# Patient Record
(demographics unavailable — no encounter records)

---

## 2025-06-25 NOTE — HISTORY OF PRESENT ILLNESS
[Patient reported mammogram was normal] : Patient reported mammogram was normal [Patient reported breast sonogram was normal] : Patient reported breast sonogram was normal [Patient reported PAP Smear was normal] : Patient reported PAP Smear was normal [Patient reported bone density results were abnormal] : Patient reported bone density results were abnormal [Patient reported colonoscopy was normal] : Patient reported colonoscopy was normal [FreeTextEntry1] : 64 y/o PM  here for annual and to establish care.  New to practice and [facility].  No concerns/complaints.  LMP >10yrs ago. Has vaginal dryness but not sexually active so not bothering her.  H/o osteoporosis on medication. Managed by her PCP, wants to get order from them not us.  Reports gets US due to dense breast tissue.  No family h/o GYN malignancies.  h/o augmentation and implant removal many years ago.   [Mammogramdate] : 01/24 [BreastSonogramDate] : 01/24 [PapSmeardate] : 01/23 [BoneDensityDate] : 01/23 [TextBox_37] : osteoporosis [ColonoscopyDate] : 01/23 [TextBox_43] : recall 3 years

## 2025-06-25 NOTE — PHYSICAL EXAM
[MA] : MA [FreeTextEntry2] : Marlee [Appropriately responsive] : appropriately responsive [Alert] : alert [No Acute Distress] : no acute distress [Soft] : soft [Non-tender] : non-tender [Non-distended] : non-distended [Oriented x3] : oriented x3 [Examination Of The Breasts] : a normal appearance [No Masses] : no breast masses were palpable [Labia Majora] : normal [Labia Minora] : normal [Normal] : normal [Uterine Adnexae] : non-palpable